# Patient Record
Sex: FEMALE | NOT HISPANIC OR LATINO | URBAN - METROPOLITAN AREA
[De-identification: names, ages, dates, MRNs, and addresses within clinical notes are randomized per-mention and may not be internally consistent; named-entity substitution may affect disease eponyms.]

---

## 2018-04-03 ENCOUNTER — APPOINTMENT (RX ONLY)
Dept: URBAN - METROPOLITAN AREA CLINIC 368 | Facility: CLINIC | Age: 53
Setting detail: DERMATOLOGY
End: 2018-04-03

## 2018-04-03 DIAGNOSIS — I83.9 ASYMPTOMATIC VARICOSE VEINS OF LOWER EXTREMITIES: ICD-10-CM

## 2018-04-03 DIAGNOSIS — I87.2 VENOUS INSUFFICIENCY (CHRONIC) (PERIPHERAL): ICD-10-CM

## 2018-04-03 DIAGNOSIS — M79.6 PAIN IN LIMB, HAND, FOOT, FINGERS AND TOES: ICD-10-CM

## 2018-04-03 PROBLEM — M79.652 PAIN IN LEFT THIGH: Status: ACTIVE | Noted: 2018-04-03

## 2018-04-03 PROBLEM — M79.651 PAIN IN RIGHT THIGH: Status: ACTIVE | Noted: 2018-04-03

## 2018-04-03 PROBLEM — I83.90 ASYMPTOMATIC VARICOSE VEINS OF UNSPECIFIED LOWER EXTREMITY: Status: ACTIVE | Noted: 2018-04-03

## 2018-04-03 PROCEDURE — ? DOPPLER US

## 2018-04-03 PROCEDURE — 93970 EXTREMITY STUDY: CPT

## 2018-04-03 PROCEDURE — 99242 OFF/OP CONSLTJ NEW/EST SF 20: CPT | Mod: 25

## 2018-04-03 PROCEDURE — ? COSMETIC CONSULTATION: SCLEROTHERAPY

## 2018-04-03 PROCEDURE — ? COUNSELING

## 2018-04-03 ASSESSMENT — LOCATION DETAILED DESCRIPTION DERM
LOCATION DETAILED: LEFT ANTERIOR DISTAL THIGH
LOCATION DETAILED: RIGHT ANTERIOR DISTAL THIGH
LOCATION DETAILED: LEFT KNEE

## 2018-04-03 ASSESSMENT — LOCATION SIMPLE DESCRIPTION DERM
LOCATION SIMPLE: RIGHT THIGH
LOCATION SIMPLE: LEFT THIGH
LOCATION SIMPLE: LEFT KNEE

## 2018-04-03 ASSESSMENT — LOCATION ZONE DERM: LOCATION ZONE: LEG

## 2018-04-03 NOTE — PROCEDURE: DOPPLER US
Right Mid Gsv Reflux (Sec): no reflux
Right Proximal Ssv/Lsv Directly Below Spj Size: 0.3 cm
Right Proximal Gsv Size: 0.4 cm
Left Sfj Size: 0.5 cm
Detail Level: Zone
Use Ssv Or Lsv: SSV